# Patient Record
(demographics unavailable — no encounter records)

---

## 2025-02-27 NOTE — DISCUSSION/SUMMARY
[FreeTextEntry1] : -f/u PAP and GC/CT done today -f/u prenatal blood work drawn today -Rx sent for PNV and Doxylamine-Pyridoxine

## 2025-02-27 NOTE — HISTORY OF PRESENT ILLNESS
[Regular Cycle Intervals] : periods have been regular [Currently Active] : currently active [Men] : men [Vaginal] : vaginal [No] : No [PapSmeardate] : 2022 [FreeTextEntry1] : 1/16/2025